# Patient Record
Sex: FEMALE | Race: WHITE | Employment: FULL TIME | URBAN - METROPOLITAN AREA
[De-identification: names, ages, dates, MRNs, and addresses within clinical notes are randomized per-mention and may not be internally consistent; named-entity substitution may affect disease eponyms.]

---

## 2021-04-16 ENCOUNTER — HOSPITAL ENCOUNTER (OUTPATIENT)
Age: 36
Setting detail: SPECIMEN
Discharge: HOME | End: 2021-04-16

## 2021-04-16 LAB
DIRECT EXAM: ABNORMAL
Lab: ABNORMAL
SPECIMEN DESCRIPTION: ABNORMAL

## 2021-04-17 LAB
SOURCE: NORMAL
TRICHOMONAS VAGINALI, MOLECULAR: NEGATIVE

## 2021-04-19 LAB
C. TRACHOMATIS DNA ,URINE: NEGATIVE
N. GONORRHOEAE DNA, URINE: NEGATIVE
SPECIMEN DESCRIPTION: NORMAL

## 2021-06-03 ENCOUNTER — HOSPITAL ENCOUNTER (OUTPATIENT)
Age: 36
Setting detail: SPECIMEN
Discharge: HOME | End: 2021-06-03

## 2021-06-03 ENCOUNTER — OFFICE VISIT (OUTPATIENT)
Dept: PRIMARY CARE CLINIC | Age: 36
End: 2021-06-03

## 2021-06-03 ENCOUNTER — HOSPITAL ENCOUNTER (OUTPATIENT)
Dept: ULTRASOUND IMAGING | Age: 36
Discharge: HOME | End: 2021-06-05

## 2021-06-03 VITALS
SYSTOLIC BLOOD PRESSURE: 130 MMHG | WEIGHT: 194.2 LBS | TEMPERATURE: 99.3 F | DIASTOLIC BLOOD PRESSURE: 68 MMHG | OXYGEN SATURATION: 99 % | RESPIRATION RATE: 18 BRPM | HEIGHT: 68 IN | BODY MASS INDEX: 29.43 KG/M2 | HEART RATE: 68 BPM

## 2021-06-03 DIAGNOSIS — N94.6 DYSMENORRHEA: ICD-10-CM

## 2021-06-03 DIAGNOSIS — R10.30 LOWER ABDOMINAL PAIN: ICD-10-CM

## 2021-06-03 DIAGNOSIS — R10.30 LOWER ABDOMINAL PAIN: Primary | ICD-10-CM

## 2021-06-03 LAB
-: NORMAL
AMORPHOUS: NORMAL
BACTERIA: NORMAL
BILIRUBIN URINE: NEGATIVE
CASTS UA: NORMAL /LPF (ref 0–2)
COLOR: ABNORMAL
COMMENT UA: ABNORMAL
CRYSTALS, UA: NORMAL /HPF
EPITHELIAL CELLS UA: NORMAL /HPF (ref 0–5)
GLUCOSE URINE: NEGATIVE
KETONES, URINE: NEGATIVE
LEUKOCYTE ESTERASE, URINE: NEGATIVE
MUCUS: NORMAL
NITRITE, URINE: NEGATIVE
OTHER OBSERVATIONS UA: NORMAL
PH UA: 7 (ref 5–6)
PROTEIN UA: NEGATIVE
RBC UA: NORMAL /HPF (ref 0–4)
RENAL EPITHELIAL, UA: NORMAL /HPF
SPECIFIC GRAVITY UA: 1.01 (ref 1.01–1.02)
TRICHOMONAS: NORMAL
TURBIDITY: ABNORMAL
URINE HGB: NEGATIVE
UROBILINOGEN, URINE: NORMAL
WBC UA: NORMAL /HPF (ref 0–4)
YEAST: NORMAL

## 2021-06-03 PROCEDURE — 99204 OFFICE O/P NEW MOD 45 MIN: CPT | Performed by: NURSE PRACTITIONER

## 2021-06-03 PROCEDURE — 87660 TRICHOMONAS VAGIN DIR PROBE: CPT

## 2021-06-03 PROCEDURE — 76856 US EXAM PELVIC COMPLETE: CPT

## 2021-06-03 PROCEDURE — 87510 GARDNER VAG DNA DIR PROBE: CPT

## 2021-06-03 PROCEDURE — 81001 URINALYSIS AUTO W/SCOPE: CPT

## 2021-06-03 PROCEDURE — 87480 CANDIDA DNA DIR PROBE: CPT

## 2021-06-03 RX ORDER — KETOROLAC TROMETHAMINE 30 MG/ML
30 INJECTION, SOLUTION INTRAMUSCULAR; INTRAVENOUS ONCE
Status: COMPLETED | OUTPATIENT
Start: 2021-06-03 | End: 2021-06-03

## 2021-06-03 RX ORDER — KETOROLAC TROMETHAMINE 10 MG/1
10 TABLET, FILM COATED ORAL EVERY 6 HOURS PRN
Qty: 56 TABLET | Refills: 0 | Status: SHIPPED | OUTPATIENT
Start: 2021-06-03 | End: 2021-06-17

## 2021-06-03 RX ORDER — KETOROLAC TROMETHAMINE 30 MG/ML
30 INJECTION, SOLUTION INTRAMUSCULAR; INTRAVENOUS
Qty: 2 ML | Refills: 0
Start: 2021-06-03 | End: 2021-06-03 | Stop reason: CLARIF

## 2021-06-03 RX ADMIN — KETOROLAC TROMETHAMINE 30 MG: 30 INJECTION, SOLUTION INTRAMUSCULAR; INTRAVENOUS at 11:19

## 2021-06-03 ASSESSMENT — ENCOUNTER SYMPTOMS
ABDOMINAL PAIN: 1
NAUSEA: 1
BACK PAIN: 1

## 2021-06-03 NOTE — PATIENT INSTRUCTIONS
Urinalysis does not indicate any infection today. Pelvis ultrasound did not reveal any acute findings. Will refer you to OBGYN for further evaluation. Will also send vaginitis swab and notify you of the results once available. If any infection is present, will treat you at that time. Will send in prescription for toradol to take at home for pain as needed. You can also take tylenol in addition to this, but avoid any ibuprofen or aleve. Use ice/heat to help with pain as well. If pain becomes acutely worse and intolerable, please go to ER for further evaluation/treatment. Patient Education        Painful Menstrual Cramps: Care Instructions  Your Care Instructions     Painful menstrual cramps are very common. Many women go to the doctor because of bad cramps when they get their period. You may have cramps in your back, thighs, and belly. You may also have diarrhea, constipation, or nausea. Some women also get dizzy. Pain medicine and home treatment can help you feel better. Follow-up care is a key part of your treatment and safety. Be sure to make and go to all appointments, and call your doctor if you are having problems. It's also a good idea to know your test results and keep a list of the medicines you take. How can you care for yourself at home? · Take anti-inflammatory medicines for pain. Ibuprofen (Advil, Motrin) and naproxen (Aleve) usually work better than aspirin. ? Be safe with medicines. Talk to your doctor or pharmacist before you take any of these medicines. They may not be safe if you take other medicines or have other health problems. ? Start taking the recommended dose of pain medicine as soon as you start to feel pain. Or you can start on the day before your period. Keep taking the medicine for as many days as you have cramps. ? If anti-inflammatory medicines don't help, try acetaminophen (Tylenol).   ? Do not take two or more pain medicines at the same time unless the doctor told you to. Many pain medicines have acetaminophen, which is Tylenol. Too much acetaminophen (Tylenol) can be harmful. ? Read and follow all instructions on the label. · Put a heating pad set on low or a hot water bottle on your belly. Or take a warm bath. Heat improves blood flow and may help with pain. · Lie down and put a pillow under your knees. Or lie on your side and bring your knees up to your chest. This will help with any back pressure. · Get at least 30 minutes of exercise on most days of the week. This improves blood flow and may decrease pain. Walking is a good choice. You also may want to do other activities, such as running, swimming, cycling, or playing tennis or team sports. When should you call for help? Call your doctor now or seek immediate medical care if:    · You have new or worse belly or pelvic pain.     · You have severe vaginal bleeding. Watch closely for changes in your health, and be sure to contact your doctor if:    · You have unusual vaginal bleeding.     · You do not get better as expected. Where can you learn more? Go to https://[employer]. org and sign in to your [employer] account. Enter S745 in the Chalkboard box to learn more about \"Painful Menstrual Cramps: Care Instructions. \"     If you do not have an account, please click on the \"Sign Up Now\" link. Current as of: July 17, 2020               Content Version: 12.8  © 4460-2820 [employer]wise, [employer]. Care instructions adapted under license by [facility] ([facility]). If you have questions about a medical condition or this instruction, always ask your healthcare professional. [place] [address] any warranty or liability for your use of this information.

## 2021-06-03 NOTE — LETTER
[address] [facility] A department of [place] [address]  Phone: [phone]  Fax: [phone]    SAM Montero CNP        Ladonna 3, 2021     Patient: Alan Manriquez   YOB: 1985   Date of Visit: 6/3/2021       To Whom it May Concern:    Chris Mondragon was seen in my clinic on 6/3/2021. She may return to work on 6/7/21. If you have any questions or concerns, please don't hesitate to call.     Sincerely,         SAM Montero CNP

## 2021-06-03 NOTE — PROGRESS NOTES
back pain). Neurological: Positive for headaches. PAST MEDICAL HISTORY   History reviewed. No pertinent past medical history. SURGICAL HISTORY     Patient  has no past surgical history on file. CURRENT MEDICATIONS       No outpatient medications prior to visit. No facility-administered medications prior to visit. ALLERGIES     Patient is has No Known Allergies. FAMILY HISTORY     Patient's family history is not on file. SOCIAL HISTORY     Patient  reports that she has been smoking. She has never used smokeless tobacco.    PHYSICAL EXAM     VITALS  BP: 130/68, Temp: 99.3 °F (37.4 °C), Pulse: 68, Resp: 18, SpO2: 99 %  Physical Exam  Vitals reviewed. Constitutional:       Appearance: Normal appearance. Comments: tearful   Cardiovascular:      Rate and Rhythm: Normal rate and regular rhythm. Heart sounds: Normal heart sounds. No murmur heard. Pulmonary:      Effort: Pulmonary effort is normal. No respiratory distress. Breath sounds: Normal breath sounds. No wheezing, rhonchi or rales. Abdominal:      Palpations: Abdomen is soft. Tenderness: There is abdominal tenderness in the suprapubic area. There is no right CVA tenderness, left CVA tenderness, guarding or rebound. Negative signs include McBurney's sign. Genitourinary:     General: Normal vulva. Vagina: Normal. No foreign body. No lesions. Cervix: Cervical motion tenderness present. No friability or erythema. Uterus: Not enlarged, not fixed and not tender. Adnexa:         Right: Mass present. No tenderness. Left: No mass or tenderness. Musculoskeletal:      Cervical back: Normal range of motion and neck supple. Lymphadenopathy:      Cervical: No cervical adenopathy. Skin:     General: Skin is warm and dry. Capillary Refill: Capillary refill takes less than 2 seconds. Neurological:      General: No focal deficit present. Mental Status: She is alert. DIAGNOSTIC RESULTS   Labs:No results found for this visit on 06/03/21.  06/03/21 1152  Microscopic Urinalysis   Collected: 06/03/21 1112  Final result    -      Bacteria, UA None   WBC, UA None /HPF Mucus, UA NOT REPORTED   RBC, UA None /HPF Trichomonas, UA NOT REPORTED   Casts UA NOT REPORTED /LPF Amorphous, UA NOT REPORTED   Crystals, UA NOT REPORTED /HPF Other Observations UA NOT REPORTED   Epithelial Cells UA 0 TO 4 /HPF Yeast, UA NOT REPORTED   Renal Epithelial, UA NOT REPORTED /HPF            06/03/21 1152     Urinalysis Reflex to Culture   Collected: 06/03/21 1112  Final result  Specimen: Urine, clean catch    Color, UA NOT REPORTED pH, UA 7.0High    Turbidity UA NOT REPORTED Protein, UA NEGATIVE   Glucose, Ur NEGATIVE Urobilinogen, Urine Normal   Bilirubin Urine NEGATIVE Nitrite, Urine NEGATIVE   Ketones, Urine NEGATIVE Leukocyte Esterase, Urine NEGATIVE   Specific Gravity, UA 1.010 Urinalysis Comments NOT REPORTED   Urine Hgb NEGATIVE                IMAGING:  EXAMINATION:   PELVIC ULTRASOUND       6/3/2021       TECHNIQUE:   Transabdominal pelvic ultrasound was performed.       COMPARISON:   None       HISTORY:   ORDERING SYSTEM PROVIDED HISTORY: Lower abdominal pain   TECHNOLOGIST PROVIDED HISTORY:   Low abdominal pain x 3 days.  Hx recurrent pain associated with menstruation   Reason for Exam: pelvic pain x3 days   Acuity: Acute   Type of Exam: Initial   Relevant Medical/Surgical History: hx of tubal ligation.  DEPO shot x1 month   ago       FINDINGS:       Measurements:       Uterus:  9.2 x 5.6 x 4.9 cm       Endometrial stripe:  11 mm       Right Ovary:  2.9 x 2.5 x 2.4 cm       Left Ovary:  3.0 x 2.3 x 2.1 cm           Ultrasound Findings:       Uterus: Uterus demonstrates normal myometrial echotexture.       Endometrial stripe: Endometrial stripe is within normal limits.       Right Ovary: Right ovary is within normal limits.       Left Ovary:  Left ovary is within normal limits.       Free Fluid: Expiration Date:   7/3/2021     Order Specific Question:   SPECIFY(EX-CATH,MIDSTREAM,CYSTO,ETC)? Answer:   midstream   [address], [place], OB/GYN, [place]     Referral Priority:   Routine     Referral Type:   Eval and Treat     Referral Reason:   Specialty Services Required     Referred to Provider:   SAM Mathew CNM     Requested Specialty:   Certified Nurse Midwife     Number of Visits Requested:   1     Outpatient Encounter Medications as of 6/3/2021   Medication Sig Dispense Refill    [DISCONTINUED] ketorolac (TORADOL) 60 MG/2ML injection Inject 1 mL into the muscle once as needed for Pain 2 mL 0    [] ketorolac (TORADOL) injection 30 mg        No facility-administered encounter medications on file as of 6/3/2021. No follow-ups on file.                 Electronically signed by SAM Valdes CNP on 6/3/2021 at 11:30 AM

## 2021-06-04 DIAGNOSIS — B96.89 BACTERIAL VAGINOSIS: Primary | ICD-10-CM

## 2021-06-04 DIAGNOSIS — B37.31 VAGINAL YEAST INFECTION: ICD-10-CM

## 2021-06-04 DIAGNOSIS — N76.0 BACTERIAL VAGINOSIS: Primary | ICD-10-CM

## 2021-06-04 LAB
DIRECT EXAM: ABNORMAL
Lab: ABNORMAL
SPECIMEN DESCRIPTION: ABNORMAL

## 2021-06-04 RX ORDER — FLUCONAZOLE 150 MG/1
TABLET ORAL
Qty: 2 TABLET | Refills: 0 | Status: SHIPPED | OUTPATIENT
Start: 2021-06-04

## 2021-06-04 RX ORDER — METRONIDAZOLE 500 MG/1
500 TABLET ORAL 2 TIMES DAILY
Qty: 14 TABLET | Refills: 0 | Status: SHIPPED | OUTPATIENT
Start: 2021-06-04 | End: 2021-06-11

## 2022-07-15 ENCOUNTER — HOSPITAL ENCOUNTER (OUTPATIENT)
Age: 37
Setting detail: SPECIMEN
Discharge: HOME | End: 2022-07-15

## 2022-07-15 LAB
C-REACTIVE PROTEIN: <3 MG/L (ref 0–5)
HCT VFR BLD CALC: 43.8 % (ref 36.3–47.1)
HEMOGLOBIN: 14.6 G/DL (ref 11.9–15.1)
MCH RBC QN AUTO: 31 PG (ref 25.2–33.5)
MCHC RBC AUTO-ENTMCNC: 33.3 G/DL (ref 28.4–34.8)
MCV RBC AUTO: 93 FL (ref 82.6–102.9)
NRBC AUTOMATED: 0 PER 100 WBC
PDW BLD-RTO: 12.4 % (ref 11.8–14.4)
PLATELET # BLD: 382 K/UL (ref 138–453)
PMV BLD AUTO: 10.5 FL (ref 8.1–13.5)
RBC # BLD: 4.71 M/UL (ref 3.95–5.11)
SEDIMENTATION RATE, ERYTHROCYTE: 14 MM/HR (ref 0–20)
TSH SERPL DL<=0.05 MIU/L-ACNC: 0.64 UIU/ML (ref 0.3–5)
WBC # BLD: 13.4 K/UL (ref 3.5–11.3)